# Patient Record
Sex: MALE | Race: WHITE
[De-identification: names, ages, dates, MRNs, and addresses within clinical notes are randomized per-mention and may not be internally consistent; named-entity substitution may affect disease eponyms.]

---

## 2020-12-02 ENCOUNTER — HOSPITAL ENCOUNTER (EMERGENCY)
Dept: HOSPITAL 8 - ED | Age: 35
Discharge: HOME | End: 2020-12-02
Payer: COMMERCIAL

## 2020-12-02 VITALS — DIASTOLIC BLOOD PRESSURE: 89 MMHG | SYSTOLIC BLOOD PRESSURE: 137 MMHG

## 2020-12-02 VITALS — HEIGHT: 69 IN | BODY MASS INDEX: 37.94 KG/M2 | WEIGHT: 256.18 LBS
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
## 2020-12-02 DIAGNOSIS — S43.402A: Primary | ICD-10-CM

## 2020-12-02 DIAGNOSIS — F17.210: ICD-10-CM

## 2020-12-02 PROCEDURE — 99281 EMR DPT VST MAYX REQ PHY/QHP: CPT

## 2020-12-02 PROCEDURE — 99406 BEHAV CHNG SMOKING 3-10 MIN: CPT

## 2020-12-02 NOTE — NUR
PT RESTING ON GURNEY, NAD NOTED AT THIS TIME, WILL CONTINUE TO MONITOR. PER PT 
NO NEEDS A THIST TIME.